# Patient Record
Sex: MALE | Race: WHITE | Employment: FULL TIME | URBAN - METROPOLITAN AREA
[De-identification: names, ages, dates, MRNs, and addresses within clinical notes are randomized per-mention and may not be internally consistent; named-entity substitution may affect disease eponyms.]

---

## 2018-05-09 ENCOUNTER — HOSPITAL ENCOUNTER (OUTPATIENT)
Age: 43
Discharge: HOME | End: 2018-05-09
Attending: FAMILY MEDICINE
Payer: COMMERCIAL

## 2018-05-09 VITALS
WEIGHT: 212 LBS | OXYGEN SATURATION: 100 % | HEART RATE: 67 BPM | TEMPERATURE: 98 F | DIASTOLIC BLOOD PRESSURE: 80 MMHG | RESPIRATION RATE: 18 BRPM | SYSTOLIC BLOOD PRESSURE: 128 MMHG

## 2018-05-09 DIAGNOSIS — R53.83 FATIGUE, UNSPECIFIED TYPE: ICD-10-CM

## 2018-05-09 DIAGNOSIS — R35.0 URINARY FREQUENCY: Primary | ICD-10-CM

## 2018-05-09 PROCEDURE — 87086 URINE CULTURE/COLONY COUNT: CPT | Performed by: FAMILY MEDICINE

## 2018-05-09 PROCEDURE — 99204 OFFICE O/P NEW MOD 45 MIN: CPT

## 2018-05-09 PROCEDURE — 81002 URINALYSIS NONAUTO W/O SCOPE: CPT | Performed by: FAMILY MEDICINE

## 2018-05-09 PROCEDURE — 36415 COLL VENOUS BLD VENIPUNCTURE: CPT

## 2018-05-09 PROCEDURE — 85025 COMPLETE CBC W/AUTO DIFF WBC: CPT | Performed by: FAMILY MEDICINE

## 2018-05-09 PROCEDURE — 80047 BASIC METABLC PNL IONIZED CA: CPT

## 2018-05-09 RX ORDER — CIPROFLOXACIN 500 MG/1
500 TABLET, FILM COATED ORAL 2 TIMES DAILY
Qty: 14 TABLET | Refills: 0 | Status: SHIPPED | OUTPATIENT
Start: 2018-05-09 | End: 2018-05-16

## 2018-05-09 NOTE — ED PROVIDER NOTES
Patient Seen in: [address] [facility]    History   Patient presents with:  Urinary Symptoms (urologic)    Stated Complaint: Urinary problem x4 days    HPI    26-year-old male presently chief complaint of urinary frequency, urgency fo distension. There is no tenderness. There is no rebound and no guarding. Neurological: He is alert. Nursing note and vitals reviewed.       ED Course     Labs Reviewed   POCT URINALYSIS DIPSTICK - Abnormal; Notable for the following:        Result Value

## 2018-07-09 ENCOUNTER — TELEPHONE (OUTPATIENT)
Dept: GASTROENTEROLOGY | Facility: CLINIC | Age: 43
End: 2018-07-09

## 2018-07-09 ENCOUNTER — OFFICE VISIT (OUTPATIENT)
Dept: GASTROENTEROLOGY | Facility: CLINIC | Age: 43
End: 2018-07-09

## 2018-07-09 VITALS
WEIGHT: 215 LBS | DIASTOLIC BLOOD PRESSURE: 80 MMHG | HEART RATE: 61 BPM | HEIGHT: 70.5 IN | BODY MASS INDEX: 30.44 KG/M2 | SYSTOLIC BLOOD PRESSURE: 122 MMHG

## 2018-07-09 DIAGNOSIS — R10.32 LLQ ABDOMINAL PAIN: ICD-10-CM

## 2018-07-09 DIAGNOSIS — Z80.0 FAMILY HISTORY OF COLON CANCER: ICD-10-CM

## 2018-07-09 DIAGNOSIS — Z87.19 HISTORY OF COLONIC DIVERTICULITIS: Primary | ICD-10-CM

## 2018-07-09 PROCEDURE — 99212 OFFICE O/P EST SF 10 MIN: CPT | Performed by: INTERNAL MEDICINE

## 2018-07-09 PROCEDURE — 99243 OFF/OP CNSLTJ NEW/EST LOW 30: CPT | Performed by: INTERNAL MEDICINE

## 2018-07-09 RX ORDER — CIPROFLOXACIN 500 MG/1
500 TABLET, FILM COATED ORAL DAILY
COMMUNITY
Start: 2018-07-03 | End: 2018-07-13

## 2018-07-09 RX ORDER — METRONIDAZOLE 500 MG/1
500 TABLET ORAL DAILY
COMMUNITY
Start: 2018-07-03 | End: 2018-07-13

## 2018-07-09 NOTE — PROGRESS NOTES
HPI:    Patient ID: Brandy So is a 37year old  here at the local college with a family history of colon cancer in his father. Body mass index is 30.41 kg/m².     Mr Amalia Haq comes in to review that family history and discuss reuben notes that father was a smoker, possibly regular drinker. Endoscopy history: At least 2 previous colonoscopy examinations, most recent was August 2015. Mr Heidi Layne does not recall finding of colon polyps on previous colonoscopy examinations.     Recall father, 6th decade of life. At least 2 colonoscopy examinations have been negative to date by his recollection, including August 2015. He comes in to discuss this family history, and recurring diverticulitis complaints since approximately age 43.     Nascimento Referrals:  None       SB#[id number]

## 2018-07-09 NOTE — TELEPHONE ENCOUNTER
GI RNs–  Please recall for colonoscopy examination 7/1/2020.   Diagnosis = family history of colon cancer in a parent

## 2018-10-07 ENCOUNTER — HOSPITAL ENCOUNTER (OUTPATIENT)
Age: 43
Discharge: HOME | End: 2018-10-07
Attending: FAMILY MEDICINE
Payer: COMMERCIAL

## 2018-10-07 VITALS
HEART RATE: 64 BPM | HEIGHT: 70 IN | DIASTOLIC BLOOD PRESSURE: 93 MMHG | RESPIRATION RATE: 16 BRPM | WEIGHT: 195 LBS | OXYGEN SATURATION: 95 % | SYSTOLIC BLOOD PRESSURE: 135 MMHG | BODY MASS INDEX: 27.92 KG/M2 | TEMPERATURE: 98 F

## 2018-10-07 DIAGNOSIS — N34.1 URETHRITIS, NONSPECIFIC: Primary | ICD-10-CM

## 2018-10-07 PROCEDURE — 87491 CHLMYD TRACH DNA AMP PROBE: CPT | Performed by: FAMILY MEDICINE

## 2018-10-07 PROCEDURE — 99214 OFFICE O/P EST MOD 30 MIN: CPT

## 2018-10-07 PROCEDURE — 87591 N.GONORRHOEAE DNA AMP PROB: CPT | Performed by: FAMILY MEDICINE

## 2018-10-07 PROCEDURE — 87661 TRICHOMONAS VAGINALIS AMPLIF: CPT | Performed by: FAMILY MEDICINE

## 2018-10-07 PROCEDURE — 81002 URINALYSIS NONAUTO W/O SCOPE: CPT | Performed by: FAMILY MEDICINE

## 2018-10-07 RX ORDER — DOXYCYCLINE 100 MG/1
100 TABLET ORAL EVERY 12 HOURS
Qty: 14 TABLET | Refills: 0 | Status: SHIPPED | OUTPATIENT
Start: 2018-10-07 | End: 2018-10-14

## 2018-10-07 NOTE — ED INITIAL ASSESSMENT (HPI)
Pt presents today with c/o minor discomfort with urination since Wednesday. Pt denies any blood in the urine or fevers.

## 2018-10-07 NOTE — ED PROVIDER NOTES
Patient Seen in: [address] [facility]    History   Patient presents with:  Urinary Symptoms (urologic)    Stated Complaint: possible UTI    61-year-old male comes in with concerns that on Tuesday of this past week he had some lower a sores, hematuria, penile pain, penile swelling, testicular pain and urgency. Musculoskeletal: Negative for back pain. Neurological: Negative for dizziness, light-headedness and headaches. Hematological: Negative for adenopathy.        Positive for sta POCT URINALYSIS DIPSTICK - Normal   TRICHOMONAS VAGINALIS BY TMA   CHLAMYDIA/GONOCOCCUS, ARSEN                MDM               Disposition and Plan     Clinical Impression:  Urethritis, nonspecific  (primary encounter diagnosis)    Patient is being seen w

## 2018-10-12 NOTE — ED NOTES
Pt. Called for test results. Notified of negative culture results. He is advised to continue Doxycycline as prescribed due to past hx. Of prostatitis and urethritis. He reports his urinary symptoms have resolved, but the antibiotic is causing GI upset.

## 2018-11-20 ENCOUNTER — HOSPITAL ENCOUNTER (OUTPATIENT)
Age: 43
Discharge: HOME | End: 2018-11-20
Attending: FAMILY MEDICINE
Payer: COMMERCIAL

## 2018-11-20 VITALS
WEIGHT: 200 LBS | HEIGHT: 70 IN | HEART RATE: 68 BPM | BODY MASS INDEX: 28.63 KG/M2 | DIASTOLIC BLOOD PRESSURE: 90 MMHG | OXYGEN SATURATION: 95 % | TEMPERATURE: 97 F | SYSTOLIC BLOOD PRESSURE: 126 MMHG | RESPIRATION RATE: 18 BRPM

## 2018-11-20 DIAGNOSIS — K12.2 UVULITIS: Primary | ICD-10-CM

## 2018-11-20 PROCEDURE — 87081 CULTURE SCREEN ONLY: CPT | Performed by: FAMILY MEDICINE

## 2018-11-20 PROCEDURE — 87430 STREP A AG IA: CPT | Performed by: FAMILY MEDICINE

## 2018-11-20 PROCEDURE — 99214 OFFICE O/P EST MOD 30 MIN: CPT

## 2018-11-20 RX ORDER — PREDNISONE 20 MG/1
20 TABLET ORAL DAILY
Qty: 3 TABLET | Refills: 0 | Status: SHIPPED | OUTPATIENT
Start: 2018-11-20 | End: 2018-11-23

## 2018-11-20 NOTE — ED INITIAL ASSESSMENT (HPI)
Pt c/o sore throat for 3 days. 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 Pt states that his pain is getting worse.

## 2018-11-20 NOTE — ED PROVIDER NOTES
Patient Seen in: [address]    History   Patient presents with:  Sore Throat    Stated Complaint: sore throat x3 days    HPI    15-year-old male with a history of diverticulitis and knee surgeries presents the [facility] swollen and midline. Neck: Supple, NT, FROM. No meningeal signs. LAD: No lymphadenopathy. Heart: S1,S2 RRR, No murmur  Lungs: CTA bilateral. No wheezes rales or rhonchi. Skin: Warm and dry  Neuro: A&O x3.  No focal deficits      ED Course     Labs Revie

## 2019-04-08 ENCOUNTER — APPOINTMENT (OUTPATIENT)
Dept: GENERAL RADIOLOGY | Age: 44
End: 2019-04-08
Attending: FAMILY MEDICINE
Payer: COMMERCIAL

## 2019-04-08 ENCOUNTER — HOSPITAL ENCOUNTER (OUTPATIENT)
Age: 44
Discharge: HOME | End: 2019-04-08
Attending: FAMILY MEDICINE
Payer: COMMERCIAL

## 2019-04-08 VITALS
BODY MASS INDEX: 29.62 KG/M2 | TEMPERATURE: 98 F | DIASTOLIC BLOOD PRESSURE: 78 MMHG | HEART RATE: 79 BPM | RESPIRATION RATE: 18 BRPM | WEIGHT: 200 LBS | SYSTOLIC BLOOD PRESSURE: 128 MMHG | OXYGEN SATURATION: 95 % | HEIGHT: 69 IN

## 2019-04-08 DIAGNOSIS — S62.640A NONDISPLACED FRACTURE OF PROXIMAL PHALANX OF RIGHT INDEX FINGER, INITIAL ENCOUNTER FOR CLOSED FRACTURE: Primary | ICD-10-CM

## 2019-04-08 PROCEDURE — 26720 TREAT FINGER FRACTURE EACH: CPT

## 2019-04-08 PROCEDURE — 99213 OFFICE O/P EST LOW 20 MIN: CPT

## 2019-04-08 PROCEDURE — 73140 X-RAY EXAM OF FINGER(S): CPT | Performed by: FAMILY MEDICINE

## 2019-04-08 NOTE — ED PROVIDER NOTES
Patient Seen in: [address] [facility]    History   Patient presents with:  Upper Extremity Injury (musculoskeletal)    Stated Complaint: right hand injury x1 day    HPI    53-year-old male presents for right index finger injury.   Sta erythema over MCP. Limited ROM due to pain. CMS was intact. Neurological: He is alert and oriented to person, place, and time. Skin: Skin is warm and dry.        ED Course   Labs Reviewed - No data to display    MDM       Patient presents for right ind

## 2019-04-08 NOTE — ED INITIAL ASSESSMENT (HPI)
Yesterday pt injured right index finger while playing basketball. C/o pain, swelling and redness. Decreased ROM.

## 2020-07-08 ENCOUNTER — TELEPHONE (OUTPATIENT)
Dept: GASTROENTEROLOGY | Facility: CLINIC | Age: 45
End: 2020-07-08

## 2020-07-08 NOTE — TELEPHONE ENCOUNTER
----- Message from Will Hummel RN sent at 7/9/2018 10:45 AM CDT -----  Regarding: recall colonoscopy  GI RNs-  Please recall for colonoscopy examination 7/1/2020. Diagnosis = family history of colon cancer in a parent. Per CAB.

## 2020-08-24 ENCOUNTER — TELEPHONE (OUTPATIENT)
Dept: GASTROENTEROLOGY | Facility: CLINIC | Age: 45
End: 2020-08-24

## 2020-08-26 NOTE — TELEPHONE ENCOUNTER
Dr Rom Mckeon last saw this pt in 2018 for a consult    Hx of diverticulitis, He takes his Nery Blake everyday because you told him to & he swears by it!     Family hx of colon cancer: Father    Last colonoscopy with Dr Katja Maldonado:    Per OV

## 2020-08-26 NOTE — TELEPHONE ENCOUNTER
Please disregard the previous message. Patient is calling in again to follow up on previous message to schedule the procedure.  Please advise thank you

## 2020-08-26 NOTE — TELEPHONE ENCOUNTER
Patient is calling in again to follow up on previous message to schedule the procedure.  Please advise thank you

## 2020-08-28 NOTE — TELEPHONE ENCOUNTER
What great news.     Thanks Ramírez Wilson, let's schedule a colonoscopy exam.    GI schedulers –    Please schedule colonoscopy exam at [facility] ([facility] [address]    BMI Readings from Last 1 Encounters:  04/15/19 : 28.70 kg/m²     MAC anesthesia

## 2020-08-31 NOTE — TELEPHONE ENCOUNTER
Patient called in stating that he needs clarification on the prep instructions for the procedure.  Please advise thank you

## 2020-08-31 NOTE — TELEPHONE ENCOUNTER
Scheduled for:  Colonoscopy [id number]  Provider Name:  Dr. Melisa Edwards  Date:  9/10/20  Location:  [facility]  Sedation:  MAC  Time:  1300 (pt is aware that [facility] will call the day before to confirm arrival time)   Prep:  Suprep, sent via zePASS/Allergies Reconciled?

## 2020-09-01 NOTE — TELEPHONE ENCOUNTER
Pt was asking if there is a prep he has to take. I advised him that Suprep was sent to the [facility] in [place]. He states he received the instructions on [employer]     If he has any further questions he will call me back

## 2020-09-07 ENCOUNTER — LAB REQUISITION (OUTPATIENT)
Dept: LAB | Facility: HOSPITAL | Age: 45
End: 2020-09-07
Payer: COMMERCIAL

## 2020-09-07 DIAGNOSIS — Z20.828 CONTACT WITH AND (SUSPECTED) EXPOSURE TO OTHER VIRAL COMMUNICABLE DISEASES: ICD-10-CM

## 2020-09-08 LAB — SARS-COV-2 RNA RESP QL NAA+PROBE: NOT DETECTED

## 2020-09-10 ENCOUNTER — SURGERY CENTER DOCUMENTATION (OUTPATIENT)
Dept: SURGERY | Age: 45
End: 2020-09-10

## 2020-09-10 NOTE — PROCEDURES
[facility]      COLONOSCOPY PROCEDURE REPORT     DATE OF PROCEDURE:  9/10/2020     PCP: Cari Hayward MD     PREOPERATIVE DIAGNOSIS: Screening colonoscopy examination for family history of colon cancer     POSTOPERATIVE DIAGNOSIS: bouts of diverticulitis. · Repeat colonoscopy examination in 5 years.

## 2025-02-17 ENCOUNTER — TELEPHONE (OUTPATIENT)
Facility: CLINIC | Age: 50
End: 2025-02-17

## 2025-02-17 NOTE — TELEPHONE ENCOUNTER
Colon recall.     Reviewed allergies pharmacy, medications, medical/surgical history on file, and GI symptoms.     Please provide orders if ok to schedule directly.     Last Procedure, Date, MD:  09/10/2020, Dr Macias, Colonoscopy  Last Diagnosis:  Family hx of colon cancer  Recalled (mth/yrs): 5 years  Sedation Used Previously:  MAC  Last Prep Used (if known):  Suprep  Quality Of Prep (if known): excellent  Anticoagulants:no  Diuretics: no  Diabetic Medication (Includes Insulin): no  Weight loss Medication:  no  Iron/Herbal/Multivitamin Supplements:MVI  Marijuana/Vaping/CBD:no  Height/Weight: 5'9.5\"/215  BMI: 31.3  Hx of Cardiac &/or CVA Issues (MI/Stroke): no  If yes, in the last 12 months?   Devices Pacemaker/Defibrillator/Stent(s): no  Respiratory Issues/Oxygen Use/BLAKE/COPD: no  CiPAP/BiPAP:   Issues w/ Anesthesia: no    Symptoms (Y/N): no  Symptoms Details: N/A    Special Comments/Notes: I spoke to the pt. He does not have a PCP. I gave him the phone number for physician referral [phone] . He will call to make an appt with a MD to establish care and have a physical. He has had no new medical issues or he is not taking any new medications since he last saw you    Thank you!      Zachary Macias MD   Physician  Specialty: GASTROENTEROLOGY     Procedures  Signed     Encounter Date: 9/10/2020     Signed         [facility]        COLONOSCOPY PROCEDURE REPORT     DATE OF PROCEDURE:  9/10/2020      PCP: MADELIN GARCÍA MD     PREOPERATIVE DIAGNOSIS: Screening colonoscopy examination for family history of colon cancer     POSTOPERATIVE DIAGNOSIS:  See impression.     SURGEON:  Zachary Macias M.D.     SEDATION:    MAC anesthesia provided by the Anesthesia Service.  MAC anesthesia requested due to anticipated intolerance of colonoscopy examination under safe doses conscious sedation medications     COLONOSCOPY PROCEDURE:   After the nature and risks of colonoscopy examination  under MAC anesthesia were discussed with the patient and all questions answered, informed consent was obtained.  The patient was sedated as above.       Digital rectal exam was performed which showed no masses.  The Olympus pediatric video colonoscope was placed in the patient's rectum and advanced under direct visualization through the entire length of the colon up to the cecum and terminal ileum.  Retroflex exam performed up the ascending colon to the hepatic flexure.  The cecum was confirmed by landmarks including appendiceal orifice, cecal trifold, ileocecal valve.  Retroflexion was performed in the rectum.     The quality of the prep was excellent.     COLONOSCOPY FINDINGS:    Normal cecum ileocecal valve and terminal ileum.  Few, rare diverticuli ascending colon.  Mildly edematous, swollen haustral fold in the proximal or mid-transverse colon.  Initially part of this haustral fold was difficult to distinguish from a flat neoplasm.  I performed submucosal saline injection to further evaluate, injecting several mL of saline under this area and the mucosa flattened out and became perfectly smooth.  No lesion.  We therefore left this area alone.  Moderate to severe diverticulosis sigmoid colon.  Small internal hemorrhoids.     RECOMMENDATIONS:  High fiber diet.  Continue current daily bowel regimen to prevent further bouts of diverticulitis.  Repeat colonoscopy examination in 5 years.            ============================

## 2025-02-20 RX ORDER — SODIUM, POTASSIUM,MAG SULFATES 17.5-3.13G
SOLUTION, RECONSTITUTED, ORAL ORAL
Qty: 1 EACH | Refills: 0 | Status: SHIPPED | OUTPATIENT
Start: 2025-02-20

## 2025-02-21 NOTE — TELEPHONE ENCOUNTER
Your Appointments      Wednesday March 26, 2025 2:00 PM  Physical - New Patient with Gene Torres MD  [facility] ([facility]) [address] [address] [phone]

## 2025-02-21 NOTE — TELEPHONE ENCOUNTER
Thanks Isai this is awesome.    Elevated BMI 31.3, patient is to establish care with PCP    My previous colonoscopy report 9/10/2020 reviewed.  Family history of colon cancer.  Reassuring exam with 5-year follow-up recommended.    GI schedulers -    Please call Mr. Murrieta to schedule colonoscopy exam at [facility] ([facility])    MAC anesthesia     BP Readings from Last 5 Encounters:   04/08/19 128/78   11/20/18 126/90   10/07/18 (!) 135/93   07/09/18 122/80   05/09/18 128/80       Suprep small volume bowel prep if covered by insurance, otherwise   Golytely (PEG) 4L bowel prep  Rx sent in to [facility]      DX = family history of colon cancer, screening colonoscopy examination    Medication instructions:    None

## 2025-03-26 ENCOUNTER — OFFICE VISIT (OUTPATIENT)
Dept: INTERNAL MEDICINE CLINIC | Facility: CLINIC | Age: 50
End: 2025-03-26
Payer: COMMERCIAL

## 2025-03-26 ENCOUNTER — TELEPHONE (OUTPATIENT)
Age: 50
End: 2025-03-26

## 2025-03-26 VITALS
BODY MASS INDEX: 36.81 KG/M2 | WEIGHT: 248.5 LBS | DIASTOLIC BLOOD PRESSURE: 76 MMHG | SYSTOLIC BLOOD PRESSURE: 122 MMHG | HEART RATE: 66 BPM | HEIGHT: 69 IN

## 2025-03-26 DIAGNOSIS — Z80.0 FAMILY HISTORY OF COLON CANCER IN FATHER: ICD-10-CM

## 2025-03-26 DIAGNOSIS — Z00.00 ANNUAL PHYSICAL EXAM: Primary | ICD-10-CM

## 2025-03-26 DIAGNOSIS — Z80.0 FAMILY HISTORY OF PANCREATIC CANCER: ICD-10-CM

## 2025-03-26 PROCEDURE — 99396 PREV VISIT EST AGE 40-64: CPT | Performed by: INTERNAL MEDICINE

## 2025-03-26 PROCEDURE — 90471 IMMUNIZATION ADMIN: CPT | Performed by: INTERNAL MEDICINE

## 2025-03-26 PROCEDURE — 90677 PCV20 VACCINE IM: CPT | Performed by: INTERNAL MEDICINE

## 2025-03-26 NOTE — PROGRESS NOTES
Subjective:     Patient ID: Jae Murrieta is a 50 year old male.    Pt presents today for his annual physical.         History/Other:   Review of Systems   Constitutional: Negative.    HENT: Negative.     Eyes: Negative.    Respiratory: Negative.          No hemotysis    Cardiovascular: Negative.  Negative for chest pain, palpitations and leg swelling.        No pnd/orthopnea   Gastrointestinal: Negative.  Negative for abdominal pain, anal bleeding, blood in stool and vomiting.        No hematemesis/dysphagia   Genitourinary: Negative.         No nocturia   Allergic/Immunologic: Positive for environmental allergies. Negative for food allergies and immunocompromised state.   Neurological:  Negative for syncope.   Hematological: Negative.      Current Outpatient Medications   Medication Sig Dispense Refill    ibuprofen (MOTRIN IB) 200 MG Oral Tab Take 1 tablet (200 mg total) by mouth as needed for Pain.      FIBER OR Take by mouth.      Lactobacillus (PROBIOTIC ACIDOPHILUS OR) Take 1 capsule by mouth daily.      Na Sulfate-K Sulfate-Mg Sulf (SUPREP BOWEL PREP KIT) 17.5-3.13-1.6 GM/177ML Oral Solution Take as directed (Patient not taking: Reported on 3/26/2025) 1 Bottle 0     Allergies:Allergies[1]    Past Medical History:    Diverticulitis    since 2010      Past Surgical History:   Procedure Laterality Date    Anesth,knee arthroscopy      x4    Colonoscopy  2016    at [facility]      Family History   Problem Relation Age of Onset    Cancer Father         colon cancer 5 4y/o    Colon Cancer Father     Other (Other) Mother         valvular heart disease    Cancer Sister         breast cancer    Cancer Brother         pancreatic cancer 62 y/o    Pancreatic Cancer Brother       Social History:   Social History     Socioeconomic History    Marital status: Single   Tobacco Use    Smoking status: Never    Smokeless tobacco: Never   Vaping Use    Vaping status: Never Used   Substance and Sexual  Activity    Alcohol use: Yes     Comment: occ    Drug use: No        Objective:   Physical Exam  Constitutional:       General: He is not in acute distress.     Appearance: He is well-developed. He is obese. He is not ill-appearing, toxic-appearing or diaphoretic.   HENT:      Head: Normocephalic and atraumatic.      Right Ear: Tympanic membrane, ear canal and external ear normal.      Left Ear: Tympanic membrane, ear canal and external ear normal.      Nose: Nose normal.      Mouth/Throat:      Pharynx: No oropharyngeal exudate.   Eyes:      General: No scleral icterus.        Right eye: No discharge.         Left eye: No discharge.      Conjunctiva/sclera: Conjunctivae normal.      Pupils: Pupils are equal, round, and reactive to light.   Neck:      Thyroid: No thyromegaly.      Vascular: No JVD.   Cardiovascular:      Rate and Rhythm: Normal rate and regular rhythm.      Pulses: Normal pulses.      Heart sounds: Normal heart sounds. No murmur heard.     No friction rub. No gallop.   Pulmonary:      Effort: Pulmonary effort is normal. No respiratory distress.      Breath sounds: Normal breath sounds. No wheezing or rales.   Abdominal:      General: Abdomen is flat. Bowel sounds are normal. There is no distension.      Palpations: Abdomen is soft. There is no mass.      Tenderness: There is no abdominal tenderness. There is no guarding or rebound.   Genitourinary:     Prostate: Normal. Not enlarged, not tender and no nodules present.      Rectum: Normal. No mass, tenderness or external hemorrhoid. Normal anal tone.   Musculoskeletal:         General: Normal range of motion.      Cervical back: Normal range of motion and neck supple. No rigidity.      Right lower leg: No edema.      Left lower leg: No edema.   Lymphadenopathy:      Cervical: No cervical adenopathy.   Skin:     General: Skin is warm and dry.      Coloration: Skin is not jaundiced or pale.      Findings: No rash.   Neurological:      Mental Status: He  is alert and oriented to person, place, and time.   Psychiatric:         Mood and Affect: Mood normal.         Assessment & Plan:   (Z00.00) Annual physical exam  (primary encounter diagnosis)  Plan: CBC With Differential With Platelet, Comp         Metabolic Panel (14), Hemoglobin A1C, Lipid         Panel, TSH W Reflex To Free T4, PSA, TOTAL         [5363] [Q]        Routine labs ordered. Pt given prevnar 20 today.     (Z80.0) Family history of pancreatic cancer  Plan: Genetic Referral - In Network        Pt has fhx of pancreatic ca, colon ca and breast cancer amongst immediate family members. Advised to see genetic counselor for evaluation.     (Z80.0) Family history of colon cancer in father  Plan: pt is current with his colonosocpy.     (Z68.36) BMI 36.0-36.9,adult  Plan: pt has already plans to lose weight by cutting back on his calorids.        Orders Placed This Encounter   Procedures    CBC With Differential With Platelet    Comp Metabolic Panel (14)    Hemoglobin A1C    Lipid Panel    TSH W Reflex To Free T4    PSA, TOTAL [5363] [Q]    Prevnar 20 (PCV20) [74257]       Meds This Visit:  Requested Prescriptions      No prescriptions requested or ordered in this encounter       Imaging & Referrals:  PCV20 VACCINE FOR INTRAMUSCULAR USE  OP REFERRAL TO GENETIC COUNSELOR            [1]   Allergies  Allergen Reactions    Penicillins RASH and HIVES     [facility]ner Allergy Text Annotation: penicillins

## 2025-04-04 LAB
ABSOLUTE BASOPHILS: 0 CELLS/UL (ref 0–200)
ABSOLUTE EOSINOPHILS: 0 CELLS/UL (ref 15–500)
ABSOLUTE MONOCYTES: 568 CELLS/UL (ref 200–950)
ABSOLUTE NEUTROPHILS: 4189 CELLS/UL (ref 1500–7800)
ALBUMIN/GLOBULIN RATIO: 1.6 (CALC) (ref 1–2.5)
ALBUMIN: 4.4 G/DL (ref 3.6–5.1)
ALKALINE PHOSPHATASE: 107 U/L (ref 35–144)
ALT: 32 U/L (ref 9–46)
AST: 24 U/L (ref 10–35)
BASOPHILS: 0 %
BILIRUBIN, TOTAL: 0.5 MG/DL (ref 0.2–1.2)
BUN: 15 MG/DL (ref 7–25)
CALCIUM: 9.6 MG/DL (ref 8.6–10.3)
CARBON DIOXIDE: 28 MMOL/L (ref 20–32)
CHLORIDE: 101 MMOL/L (ref 98–110)
CHOL/HDLC RATIO: 5.9 (CALC)
CHOLESTEROL, TOTAL: 230 MG/DL
CREATININE: 1.02 MG/DL (ref 0.7–1.3)
EGFR: 88 ML/MIN/1.73M2
EOSINOPHILS: 0 %
GLOBULIN: 2.8 G/DL (CALC) (ref 1.9–3.7)
GLUCOSE: 101 MG/DL (ref 65–99)
HDL CHOLESTEROL: 39 MG/DL
HEMATOCRIT: 48.5 % (ref 38.5–50)
HEMOGLOBIN A1C: 6 % OF TOTAL HGB
HEMOGLOBIN: 15.9 G/DL (ref 13.2–17.1)
LDL-CHOLESTEROL: 143 MG/DL (CALC)
LYMPHOCYTE ABSOLUTE: 2343 CELLS/UL (ref 850–3900)
LYMPHOCYTES: 33 %
MCH: 30.1 PG (ref 27–33)
MCHC: 32.8 G/DL (ref 32–36)
MCV: 91.9 FL (ref 80–100)
MONOCYTES: 8 %
MPV: 9.9 FL (ref 7.5–12.5)
NEUTROPHILS: 59 %
NON-HDL CHOLESTEROL: 191 MG/DL (CALC)
PLATELET COUNT: 265 THOUSAND/UL (ref 140–400)
POTASSIUM: 4.5 MMOL/L (ref 3.5–5.3)
PROTEIN, TOTAL: 7.2 G/DL (ref 6.1–8.1)
PSA, TOTAL: 0.78 NG/ML
RDW: 12.9 % (ref 11–15)
RED BLOOD CELL COUNT: 5.28 MILLION/UL (ref 4.2–5.8)
SODIUM: 137 MMOL/L (ref 135–146)
TRIGLYCERIDES: 292 MG/DL
TSH W/REFLEX TO FT4: 3.11 MIU/L (ref 0.4–4.5)
WHITE BLOOD CELL COUNT: 7.1 THOUSAND/UL (ref 3.8–10.8)

## 2025-04-09 ENCOUNTER — APPOINTMENT (OUTPATIENT)
Age: 50
End: 2025-04-09
Attending: INTERNAL MEDICINE
Payer: COMMERCIAL

## 2025-04-09 ENCOUNTER — OFFICE VISIT (OUTPATIENT)
Age: 50
End: 2025-04-09
Attending: INTERNAL MEDICINE
Payer: COMMERCIAL

## 2025-04-09 DIAGNOSIS — Z80.3 FAMILY HISTORY OF MALIGNANT NEOPLASM OF BREAST: ICD-10-CM

## 2025-04-09 DIAGNOSIS — Z80.0 FAMILY HISTORY OF MALIGNANT NEOPLASM OF GASTROINTESTINAL TRACT: Primary | ICD-10-CM

## 2025-04-09 NOTE — PROGRESS NOTES
Reason for visit: Mr. Murrieta is a 50-year-old gentleman referred for genetic counseling due to a family history of cancer.  He was seen for genetic counseling to discuss the option of genetic testing.  Mr. 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  He has not fathered any children to date.  He was seen in consultation with his mother, mark Arndt.   Referring MD: Gene Torres MD  Relevant family history: Mr. Murrieta shares that his brother recently passed away from pancreatic cancer at age 60, shortly following his diagnosis.  He also has a sister who was diagnosed with breast cancer at age 53.  On his paternal side of his family, his father was diagnosed with colon cancer at age 45 and passed away at age 53 and both of his father's siblings were also diagnosed with cancers; his paternal uncle with lung cancer and his paternal aunt with a post-menopausal breast cancer. His paternal grandmother was also diagnosed with a post-menopausal breast cancer. On his maternal side, his maternal grandfather, a smoker, passed away from pancreatic cancer.  He does not believe that any family members have pursued genetic testing to date aside from his sister, who reportedly did testing for BRCA1/2 only a number of years ago.  He is of Polish heritage on his maternal side of his family and of [place] and [place] Pakistani heritage on his paternal side of his family and denies any consanguinity or Ashkenazi Faith heritage.   Medical history: Mr. Murrieta reports no personal history of malignancy to date. He has had several colonoscopies performed with negative results. His digital rectal exam was normal and recent PSA testing was also negative per his report.  He denies any thyroid issues or current dermatological concerns.  He denies tobacco usage and admits to consumption of 1 alcoholic drink weekly.  He considers himself overall in good health aside from high cholesterol.    Summary:   We discussed  hereditary breast (and pancreatic) cancer with Mr. Murrieta and his mother because of his family history.  Most breast or pancreatic cancer is not hereditary; however, hereditary cancer is suspected under certain conditions, such as when the cancer occurs prior to the age of 50 (or premenopausal in women), when there are multiple affected family members, when the cancer occurs in combination with other cancers, especially ovarian cancer, and when cancer appears to be passing from generation to generation.  We discussed that new guidelines from the NCCN have recently come out which recommend testing for individuals with pancreatic cancer, as there is a significant chance of a hereditary mutation in these cases.    We discussed dominant inheritance, the pattern in which most hereditary cancer conditions are inherited.  If an individual has such a cancer predisposing gene mutation, there is a 50% chance that any offspring will not inherit the mutation and a 50% chance that he or she will inherit it.  Inheriting the mutation does not automatically mean that one will develop cancer, rather that there is an increased chance for developing certain types of cancer.  Cancer predisposing gene mutations can exist in males and females and can be passed on to both male and female offspring.    The pros and cons of cancer predisposing gene mutation testing were reviewed with  and Mrs. Murrieta.  Genetic test results can have significant impact on medical management, planning, screening, surgical decision-making, cancer risk assessment for the patient and relatives, and psychological/emotional well-being.  Mutations in the genes BRCA1 and BRCA2 account for most (but not all) cases of hereditary breast cancer or pancreatic cancer.  Mutations in other genes have also been associated with an increased risk for these cancers - but mutations in these other genes are statistically less often seen in hereditary breast cancer.    We  briefly discussed the benefits and limitations of BRCA1/2 testing versus multi-gene panels that include BRCA1/2.  Panels are an appropriate option for individuals whose history is suggestive of more than one syndrome, and they improve detection rate for identifying the underlying cause of a hereditary cancer.  Limitations of panels include an unknown percentage of variants of unknown significance, as well as an uncertainty of level of risk associated with certain unknown-penetrance genes, and therefore lack of clear guidelines for risk management of carriers of some of these mutations.  There are panels that assess for mutations in only “high risk” and clinically actionable breast cancer genes as well as larger panels that assess for mutations in high, moderate and unknown-penetrance breast cancer genes.  Genetic testing could yield one of three results: no mutation detected, deleterious mutation detected, or variant of uncertain significance.  The implications of these potential results were reviewed with  and Mrs. Murrieta.  The optimal testing strategy is to test an affected family member first.  We discussed the limitations of interpreting test results of an unaffected individual.  Specifically, a negative result in an unaffected individual is uninformative unless a known mutation has been identified in an affected relative.  Given that the majority of the affected individuals in the family have passed, this is not an available option.  Another option is testing Mr. Murrieta rather than an affected relative, and we reviewed the limitations of this testing, including concerns about discrimination by life insurers, long term healthcare or disability, which are not covered by statutes yet.   After discussing the multiple testing options, Mr. Murrieta decided that he would like to continue to consider the option of molecular genetic testing.  Given the diagnosis of multiple family members with pancreatic cancer  as well as multiple family members with breast cancer, he does meet the NCCN testing criteria guidelines for genetic testing and insurance would likely cover the testing.     Plan:  Mr. Murrieta will plan to consider the option of genetic testing and will discuss this further with his family members. If he elects to pursue this testing, our office is happy to coordinate this process.  Thank you for referring Mr. Murrieta for genetic counseling; please do not hesitate to contact our office if you have any questions or concerns, [phone].  Send to: Gene Torres MD  Time spent managing patient care: 55 minutes

## (undated) NOTE — LETTER
7/8/2020    [address] [place] [id number]            Dear Nathan Garrido,      Our records indicate that you are due for an appointment for a Colonoscopy with Ximena Mcknight MD.    Please call our office t